# Patient Record
Sex: MALE | Race: WHITE | NOT HISPANIC OR LATINO | URBAN - METROPOLITAN AREA
[De-identification: names, ages, dates, MRNs, and addresses within clinical notes are randomized per-mention and may not be internally consistent; named-entity substitution may affect disease eponyms.]

---

## 2017-06-13 ENCOUNTER — ALLSCRIPTS OFFICE VISIT (OUTPATIENT)
Dept: OTHER | Facility: OTHER | Age: 42
End: 2017-06-13

## 2017-06-19 ENCOUNTER — GENERIC CONVERSION - ENCOUNTER (OUTPATIENT)
Dept: OTHER | Facility: OTHER | Age: 42
End: 2017-06-19

## 2017-07-19 ENCOUNTER — HOSPITAL ENCOUNTER (OUTPATIENT)
Facility: HOSPITAL | Age: 42
Setting detail: OUTPATIENT SURGERY
Discharge: HOME | End: 2017-07-19
Attending: SURGERY | Admitting: SURGERY
Payer: COMMERCIAL

## 2017-07-19 ENCOUNTER — ANESTHESIA EVENT (OUTPATIENT)
Dept: PERIOP | Facility: HOSPITAL | Age: 42
End: 2017-07-19
Payer: COMMERCIAL

## 2017-07-19 ENCOUNTER — GENERIC CONVERSION - ENCOUNTER (OUTPATIENT)
Dept: OTHER | Facility: OTHER | Age: 42
End: 2017-07-19

## 2017-07-19 ENCOUNTER — ANESTHESIA (OUTPATIENT)
Dept: PERIOP | Facility: HOSPITAL | Age: 42
End: 2017-07-19
Payer: COMMERCIAL

## 2017-07-19 VITALS
RESPIRATION RATE: 16 BRPM | TEMPERATURE: 96.8 F | OXYGEN SATURATION: 96 % | WEIGHT: 250 LBS | HEART RATE: 55 BPM | SYSTOLIC BLOOD PRESSURE: 134 MMHG | DIASTOLIC BLOOD PRESSURE: 76 MMHG | BODY MASS INDEX: 31.08 KG/M2 | HEIGHT: 75 IN

## 2017-07-19 DIAGNOSIS — R22.32 LOCALIZED SWELLING, MASS AND LUMP, LEFT UPPER LIMB: ICD-10-CM

## 2017-07-19 DIAGNOSIS — R22.31 LOCALIZED SWELLING, MASS AND LUMP, RIGHT UPPER LIMB: ICD-10-CM

## 2017-07-19 PROCEDURE — 88304 TISSUE EXAM BY PATHOLOGIST: CPT | Performed by: SURGERY

## 2017-07-19 RX ORDER — GLYCOPYRROLATE 0.2 MG/ML
INJECTION INTRAMUSCULAR; INTRAVENOUS AS NEEDED
Status: DISCONTINUED | OUTPATIENT
Start: 2017-07-19 | End: 2017-07-19 | Stop reason: SURG

## 2017-07-19 RX ORDER — FENTANYL CITRATE 50 UG/ML
INJECTION, SOLUTION INTRAMUSCULAR; INTRAVENOUS AS NEEDED
Status: DISCONTINUED | OUTPATIENT
Start: 2017-07-19 | End: 2017-07-19 | Stop reason: SURG

## 2017-07-19 RX ORDER — LIDOCAINE HYDROCHLORIDE 10 MG/ML
INJECTION, SOLUTION INFILTRATION; PERINEURAL AS NEEDED
Status: DISCONTINUED | OUTPATIENT
Start: 2017-07-19 | End: 2017-07-19 | Stop reason: SURG

## 2017-07-19 RX ORDER — MAGNESIUM HYDROXIDE 1200 MG/15ML
LIQUID ORAL
Status: DISCONTINUED | OUTPATIENT
Start: 1840-12-31 | End: 2017-07-19 | Stop reason: HOSPADM

## 2017-07-19 RX ORDER — PROPOFOL 10 MG/ML
INJECTION, EMULSION INTRAVENOUS AS NEEDED
Status: DISCONTINUED | OUTPATIENT
Start: 2017-07-19 | End: 2017-07-19 | Stop reason: SURG

## 2017-07-19 RX ORDER — SODIUM CHLORIDE, SODIUM LACTATE, POTASSIUM CHLORIDE, CALCIUM CHLORIDE 600; 310; 30; 20 MG/100ML; MG/100ML; MG/100ML; MG/100ML
20 INJECTION, SOLUTION INTRAVENOUS CONTINUOUS
Status: DISCONTINUED | OUTPATIENT
Start: 2017-07-19 | End: 2017-07-19 | Stop reason: HOSPADM

## 2017-07-19 RX ORDER — OXYCODONE HYDROCHLORIDE AND ACETAMINOPHEN 5; 325 MG/1; MG/1
1 TABLET ORAL EVERY 4 HOURS PRN
Status: DISCONTINUED | OUTPATIENT
Start: 2017-07-19 | End: 2017-07-19 | Stop reason: HOSPADM

## 2017-07-19 RX ORDER — FENTANYL CITRATE/PF 50 MCG/ML
25 SYRINGE (ML) INJECTION
Status: DISCONTINUED | OUTPATIENT
Start: 2017-07-19 | End: 2017-07-19 | Stop reason: HOSPADM

## 2017-07-19 RX ORDER — SODIUM CHLORIDE, SODIUM LACTATE, POTASSIUM CHLORIDE, CALCIUM CHLORIDE 600; 310; 30; 20 MG/100ML; MG/100ML; MG/100ML; MG/100ML
INJECTION, SOLUTION INTRAVENOUS CONTINUOUS PRN
Status: DISCONTINUED | OUTPATIENT
Start: 2017-07-19 | End: 2017-07-19 | Stop reason: SURG

## 2017-07-19 RX ORDER — BUPIVACAINE HYDROCHLORIDE AND EPINEPHRINE 2.5; 5 MG/ML; UG/ML
INJECTION, SOLUTION EPIDURAL; INFILTRATION; INTRACAUDAL; PERINEURAL AS NEEDED
Status: DISCONTINUED | OUTPATIENT
Start: 2017-07-19 | End: 2017-07-19 | Stop reason: HOSPADM

## 2017-07-19 RX ORDER — PROPOFOL 10 MG/ML
INJECTION, EMULSION INTRAVENOUS CONTINUOUS PRN
Status: DISCONTINUED | OUTPATIENT
Start: 2017-07-19 | End: 2017-07-19 | Stop reason: SURG

## 2017-07-19 RX ORDER — ONDANSETRON 2 MG/ML
INJECTION INTRAMUSCULAR; INTRAVENOUS AS NEEDED
Status: DISCONTINUED | OUTPATIENT
Start: 2017-07-19 | End: 2017-07-19 | Stop reason: SURG

## 2017-07-19 RX ORDER — MAGNESIUM HYDROXIDE 1200 MG/15ML
LIQUID ORAL AS NEEDED
Status: DISCONTINUED | OUTPATIENT
Start: 2017-07-19 | End: 2017-07-19 | Stop reason: HOSPADM

## 2017-07-19 RX ORDER — IBUPROFEN 200 MG
TABLET ORAL EVERY 6 HOURS PRN
COMMUNITY

## 2017-07-19 RX ORDER — MORPHINE SULFATE 2 MG/ML
2 INJECTION, SOLUTION INTRAMUSCULAR; INTRAVENOUS EVERY 4 HOURS PRN
Status: DISCONTINUED | OUTPATIENT
Start: 2017-07-19 | End: 2017-07-19 | Stop reason: HOSPADM

## 2017-07-19 RX ORDER — KETAMINE HYDROCHLORIDE 50 MG/ML
INJECTION, SOLUTION, CONCENTRATE INTRAMUSCULAR; INTRAVENOUS AS NEEDED
Status: DISCONTINUED | OUTPATIENT
Start: 2017-07-19 | End: 2017-07-19 | Stop reason: SURG

## 2017-07-19 RX ORDER — ONDANSETRON 2 MG/ML
4 INJECTION INTRAMUSCULAR; INTRAVENOUS ONCE AS NEEDED
Status: DISCONTINUED | OUTPATIENT
Start: 2017-07-19 | End: 2017-07-19 | Stop reason: HOSPADM

## 2017-07-19 RX ORDER — OXYCODONE HYDROCHLORIDE AND ACETAMINOPHEN 5; 325 MG/1; MG/1
2 TABLET ORAL EVERY 4 HOURS PRN
Status: DISCONTINUED | OUTPATIENT
Start: 2017-07-19 | End: 2017-07-19 | Stop reason: HOSPADM

## 2017-07-19 RX ORDER — MIDAZOLAM HYDROCHLORIDE 1 MG/ML
INJECTION INTRAMUSCULAR; INTRAVENOUS AS NEEDED
Status: DISCONTINUED | OUTPATIENT
Start: 2017-07-19 | End: 2017-07-19 | Stop reason: SURG

## 2017-07-19 RX ADMIN — GLYCOPYRROLATE 0.1 MG: 0.2 INJECTION, SOLUTION INTRAMUSCULAR; INTRAVENOUS at 08:47

## 2017-07-19 RX ADMIN — FENTANYL CITRATE 25 MCG: 50 INJECTION, SOLUTION INTRAMUSCULAR; INTRAVENOUS at 08:50

## 2017-07-19 RX ADMIN — FENTANYL CITRATE 25 MCG: 50 INJECTION, SOLUTION INTRAMUSCULAR; INTRAVENOUS at 09:00

## 2017-07-19 RX ADMIN — KETAMINE HYDROCHLORIDE 10 MG: 50 INJECTION, SOLUTION INTRAMUSCULAR; INTRAVENOUS at 08:55

## 2017-07-19 RX ADMIN — MIDAZOLAM HYDROCHLORIDE 2 MG: 1 INJECTION, SOLUTION INTRAMUSCULAR; INTRAVENOUS at 08:42

## 2017-07-19 RX ADMIN — PROPOFOL 140 MCG/KG/MIN: 10 INJECTION, EMULSION INTRAVENOUS at 08:50

## 2017-07-19 RX ADMIN — CEFAZOLIN SODIUM 2000 MG: 2 SOLUTION INTRAVENOUS at 09:00

## 2017-07-19 RX ADMIN — LIDOCAINE HYDROCHLORIDE 30 MG: 10 INJECTION, SOLUTION INFILTRATION; PERINEURAL at 08:47

## 2017-07-19 RX ADMIN — KETAMINE HYDROCHLORIDE 10 MG: 50 INJECTION, SOLUTION INTRAMUSCULAR; INTRAVENOUS at 09:00

## 2017-07-19 RX ADMIN — ONDANSETRON 4 MG: 2 INJECTION INTRAMUSCULAR; INTRAVENOUS at 09:40

## 2017-07-19 RX ADMIN — KETAMINE HYDROCHLORIDE 10 MG: 50 INJECTION, SOLUTION INTRAMUSCULAR; INTRAVENOUS at 08:50

## 2017-07-19 RX ADMIN — PROPOFOL 50 MG: 10 INJECTION, EMULSION INTRAVENOUS at 08:49

## 2017-07-19 RX ADMIN — SODIUM CHLORIDE, SODIUM LACTATE, POTASSIUM CHLORIDE, AND CALCIUM CHLORIDE: .6; .31; .03; .02 INJECTION, SOLUTION INTRAVENOUS at 08:20

## 2017-12-11 ENCOUNTER — ALLSCRIPTS OFFICE VISIT (OUTPATIENT)
Dept: OTHER | Facility: OTHER | Age: 42
End: 2017-12-11

## 2017-12-11 DIAGNOSIS — R53.83 OTHER FATIGUE: ICD-10-CM

## 2017-12-11 DIAGNOSIS — R03.0 ELEVATED BLOOD PRESSURE READING WITHOUT DIAGNOSIS OF HYPERTENSION: ICD-10-CM

## 2017-12-12 ENCOUNTER — TRANSCRIBE ORDERS (OUTPATIENT)
Dept: ADMINISTRATIVE | Age: 42
End: 2017-12-12

## 2017-12-12 ENCOUNTER — APPOINTMENT (OUTPATIENT)
Dept: LAB | Age: 42
End: 2017-12-12
Payer: COMMERCIAL

## 2017-12-12 DIAGNOSIS — R53.83 OTHER FATIGUE: ICD-10-CM

## 2017-12-12 DIAGNOSIS — R03.0 ELEVATED BLOOD PRESSURE READING WITHOUT DIAGNOSIS OF HYPERTENSION: ICD-10-CM

## 2017-12-12 LAB
ALBUMIN SERPL BCP-MCNC: 4.2 G/DL (ref 3.5–5)
ALP SERPL-CCNC: 69 U/L (ref 46–116)
ALT SERPL W P-5'-P-CCNC: 33 U/L (ref 12–78)
ANION GAP SERPL CALCULATED.3IONS-SCNC: 5 MMOL/L (ref 4–13)
AST SERPL W P-5'-P-CCNC: 12 U/L (ref 5–45)
BASOPHILS # BLD AUTO: 0.01 THOUSANDS/ΜL (ref 0–0.1)
BASOPHILS NFR BLD AUTO: 0 % (ref 0–1)
BILIRUB SERPL-MCNC: 0.75 MG/DL (ref 0.2–1)
BUN SERPL-MCNC: 17 MG/DL (ref 5–25)
CALCIUM SERPL-MCNC: 9.1 MG/DL (ref 8.3–10.1)
CHLORIDE SERPL-SCNC: 102 MMOL/L (ref 100–108)
CO2 SERPL-SCNC: 28 MMOL/L (ref 21–32)
CREAT SERPL-MCNC: 0.98 MG/DL (ref 0.6–1.3)
EOSINOPHIL # BLD AUTO: 0.04 THOUSAND/ΜL (ref 0–0.61)
EOSINOPHIL NFR BLD AUTO: 1 % (ref 0–6)
ERYTHROCYTE [DISTWIDTH] IN BLOOD BY AUTOMATED COUNT: 12.5 % (ref 11.6–15.1)
GFR SERPL CREATININE-BSD FRML MDRD: 95 ML/MIN/1.73SQ M
GLUCOSE P FAST SERPL-MCNC: 122 MG/DL (ref 65–99)
HCT VFR BLD AUTO: 44.3 % (ref 36.5–49.3)
HGB BLD-MCNC: 15 G/DL (ref 12–17)
LYMPHOCYTES # BLD AUTO: 1.18 THOUSANDS/ΜL (ref 0.6–4.47)
LYMPHOCYTES NFR BLD AUTO: 18 % (ref 14–44)
MCH RBC QN AUTO: 28.8 PG (ref 26.8–34.3)
MCHC RBC AUTO-ENTMCNC: 33.9 G/DL (ref 31.4–37.4)
MCV RBC AUTO: 85 FL (ref 82–98)
MONOCYTES # BLD AUTO: 0.52 THOUSAND/ΜL (ref 0.17–1.22)
MONOCYTES NFR BLD AUTO: 8 % (ref 4–12)
NEUTROPHILS # BLD AUTO: 4.93 THOUSANDS/ΜL (ref 1.85–7.62)
NEUTS SEG NFR BLD AUTO: 73 % (ref 43–75)
NRBC BLD AUTO-RTO: 0 /100 WBCS
PLATELET # BLD AUTO: 231 THOUSANDS/UL (ref 149–390)
PMV BLD AUTO: 11.3 FL (ref 8.9–12.7)
POTASSIUM SERPL-SCNC: 4 MMOL/L (ref 3.5–5.3)
PROT SERPL-MCNC: 7.6 G/DL (ref 6.4–8.2)
RBC # BLD AUTO: 5.2 MILLION/UL (ref 3.88–5.62)
SODIUM SERPL-SCNC: 135 MMOL/L (ref 136–145)
T4 FREE SERPL-MCNC: 0.93 NG/DL (ref 0.76–1.46)
TSH SERPL DL<=0.05 MIU/L-ACNC: 1.45 UIU/ML (ref 0.36–3.74)
WBC # BLD AUTO: 6.71 THOUSAND/UL (ref 4.31–10.16)

## 2017-12-12 PROCEDURE — 85025 COMPLETE CBC W/AUTO DIFF WBC: CPT

## 2017-12-12 PROCEDURE — 36415 COLL VENOUS BLD VENIPUNCTURE: CPT

## 2017-12-12 PROCEDURE — 80053 COMPREHEN METABOLIC PANEL: CPT

## 2017-12-12 PROCEDURE — 84443 ASSAY THYROID STIM HORMONE: CPT

## 2017-12-12 PROCEDURE — 84439 ASSAY OF FREE THYROXINE: CPT

## 2017-12-12 NOTE — PROGRESS NOTES
Assessment    1  Alcohol abuse (305 00) (F10 10)   2  Hypertension (401 9) (I10)  1  Alcohol abuse-made several recommendations  He will go for screening labs and is being considered for potential treatment with naltrexone  I also few in benefit from some counseling arrangements will be made for this  Long discussion held today regarding the above  We reviewed that he would again benefit from exercise  Await results of labs 2  Hypertension-aggravated by the above-observing with elimination of alcohol 3  Subcutaneous lesions of the arms-lipoma was-previously been referred to surgery 4  Low back pain-has a history of significant lumbar declot the with prior MRI showing herniated disc at L4-5 with compression  Responded to conservative therapy in the past 5  History of cough-test to have a postinflammatory cough with URTIs  Prior allergy blood work showed mild allergies to dust mites otherwise normal   Chest x-ray benign  He never went for methacholine challenge  6   Evaluation the past for headache-felt to be from trauma  CT scan of the brain benign 7  Family history CAD-1st cousin in his 45s  Prior lipid profile benign-REVIEW NEXT VISIT  SENT FOR SCREENING LABS TO INCLUDE CBC, RANDOM SMA, FREE T4 TSH  AS NOTED POTENTIALLY BEING REFERRED TO PSYCHOLOGY  Appointment over the next few weeks   Plan  Fatigue, unspecified type, Prehypertension    · (1) CBC/PLT/DIFF; Status:Active - Retrospective Authorization; Requested for:[id number];    · (1) COMPREHENSIVE METABOLIC PANEL; Status:Active - Retrospective Authorization; Requestedfor:[id number];    · (1) T4, FREE; Status:Active - Retrospective Authorization; Requested for:[id number];    · (1) TSH; Status:Active - Retrospective Authorization; Requested for:[id number];   Go for lab work  Considering use of naltrexone 50 milligrams daily  Appointment with   History of Present Illness  HPI: patient is seen today as an emergency appointment       This patient called because he is concerned about his alcohol use  He says nightly he is drinking several glasses of wine often vodka in addition  He was stop drinking completely  He says he in go for months without alcohol then returns to it  Strong family history of alcohol abuse with grandparents, uncle and a sibling  Does not do any recreational drugs  He says his mental health is good  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  He denies any systemic symptoms  The last time he had alcohol was about 6 months ago when he went 2 months without it  He says alcohol is very much involved in day-to-day activities of his life he wishes to curtail at  He used exercise have we but has not been going to the gym   exam today he has elevated BP  This is been noted previously of though is very much agitated at the time of his current visit  patient denies any systemic symptoms  Specifically there has been no evidence of fever, night sweats, significant weight loss or significant decrease in appetite     had a long discussion today regarding the above  This was a 30-35 minute visit  We talked about psychotherapy for the above  We talked about potential use of naltrexone      Review of Systems  Complete-Male:  Constitutional: No fever or chills, feels well, no tiredness, no recent weight gain or weight loss  Eyes: No complaints of eye pain, no red eyes, no discharge from eyes, no itchy eyes  ENT: no complaints of earache, no hearing loss, no nosebleeds, no nasal discharge, no sore throat, no hoarseness  Cardiovascular: No complaints of slow heart rate, no fast heart rate, no chest pain, no palpitations, no leg claudication, no lower extremity  Respiratory: No complaints of shortness of breath, no wheezing, no cough, no SOB on exertion, no orthopnea or PND  Gastrointestinal: No complaints of abdominal pain, no constipation, no nausea or vomiting, no diarrhea or bloody stools    Genitourinary: No complaints of dysuria, no incontinence, no hesitancy, no nocturia, no genital lesion, no testicular pain  Musculoskeletal: limb pain, but-- no arthralgias,-- no joint swelling,-- no myalgias,-- no joint stiffness-- and-- no limb swelling  Integumentary: a rash, but-- no itching,-- no dry skin,-- no skin lesions-- and-- no skin wound  Neurological: No compliants of headache, no confusion, no convulsions, no numbness or tingling, no dizziness or fainting, no limb weakness, no difficulty walking  Psychiatric: Is not suicidal, no sleep disturbances, no anxiety or depression, no change in personality, no emotional problems  Endocrine: No complaints of proptosis, no hot flashes, no muscle weakness, no erectile dysfunction, no deepening of the voice, no feelings of weakness  Hematologic/Lymphatic: No complaints of swollen glands, no swollen glands in the neck, does not bleed easily, no easy bruising  Active Problems  1  Encounter for PPD test (V74 1) (Z11 1)   2  Fatigue, unspecified type (780 79) (R53 83)   3  Lipoma (214 9) (D17 9)   4  Lower back pain (724 2) (M54 5)   5  Lumbar radiculopathy (724 4) (M54 16)   6  Need for prophylactic vaccination and inoculation against influenza (V04 81) (Z23)   7  Other muscle spasm (728 85) (M62 838)   8  Prehypertension (796 2) (R03 0)   9  Skin rash (782 1) (R21)    Past Medical History  Active Problems And Past Medical History Reviewed: The active problems and past medical history were reviewed and updated today  Surgical History  Surgical History Reviewed: The surgical history was reviewed and updated today  Family History  Mother    1  No pertinent family history    Social History     · Alcohol abuse (305 00) (F10 10)   · Being A Social Drinker   · Denied: History of Drug Use   · Never A Smoker  Social History Reviewed: The social history was reviewed and updated today  The social history was reviewed and is unchanged  Current Meds   1  Cyclobenzaprine HCl - 10 MG Oral Tablet; Take 1 tablet at bedtime as needed for muscle spasm; Therapy: [id number] to ([facility])  Requested for: [id number]; Last Rx:[id number] Ordered  Medication List Reviewed: The medication list was reviewed and updated today  Allergies  1  No Known Drug Allergies    Vitals  Vital Signs    Recorded: 11Dec2017 08:08PM Recorded: 11Dec2017 05:53PM   Temperature  98 3 F   Heart Rate 68    Respiration 14    Systolic 012    Diastolic 92    Height  5 ft 10 in   Weight  263 lb    BMI Calculated  37 74   BSA Calculated  2 34       Physical Exam   Constitutional  General appearance: No acute distress, well appearing and well nourished  Head and Face  Head and face: Normal    Palpation of the face and sinuses: No sinus tenderness  Eyes  Conjunctiva and lids: No erythema, swelling or discharge  Pupils and irises: Equal, round, reactive to light  Ears, Nose, Mouth, and Throat  External inspection of ears and nose: Normal    Otoscopic examination: Tympanic membranes translucent with normal light reflex  Canals patent without erythema  Hearing: Normal    Nasal mucosa, septum, and turbinates: Normal without edema or erythema  Lips, teeth, and gums: Normal, good dentition  Oropharynx: Normal with no erythema, edema, exudate or lesions  Neck  Neck: Supple, symmetric, trachea midline, no masses  Thyroid: Normal, no thyromegaly  Pulmonary  Respiratory effort: No increased work of breathing or signs of respiratory distress  Percussion of chest: Normal    Palpation of chest: Normal    Auscultation of lungs: Clear to auscultation  Cardiovascular  Palpation of heart: Normal PMI, no thrills  Auscultation of heart: Normal rate and rhythm, normal S1 and S2, no murmurs  Carotid pulses: 2+ bilaterally  Abdominal aorta: Normal    Femoral pulses: 2+ bilaterally  Pedal pulses: 2+ bilaterally     Peripheral vascular exam: Normal    Examination of extremities for edema and/or varicosities: Normal    Chest  Breasts: Normal, no dimpling or skin changes appreciated  Palpation of breasts and axillae: Normal, no masses palpated  Chest: Normal    Abdomen  Abdomen: Non-tender, no masses  Liver and spleen: No hepatomegaly or splenomegaly  Examination for hernias: No hernias appreciated  Anus, perineum, and rectum: Normal sphincter tone, no masses, no prolapse  Genitourinary  Scrotal contents: Normal testes, no masses  Penis: Normal, no lesions  Lymphatic  Palpation of lymph nodes in neck: No lymphadenopathy  Palpation of lymph nodes in axillae: No lymphadenopathy  Palpation of lymph nodes in groin: No lymphadenopathy  Palpation of lymph nodes in other areas: No lymphadenopathy  Musculoskeletal  Gait and station: Normal    Inspection/palpation of digits and nails: Normal without clubbing or cyanosis  Inspection/palpation of joints, bones, and muscles: Normal    Range of motion: Normal    Stability: Normal    Muscle strength/tone: Normal    Skin  Skin and subcutaneous tissue: Normal without rashes or lesions  Palpation of skin and subcutaneous tissue: Normal turgor  Neurologic  Cranial nerves: Cranial nerves 2-12 intact  Reflexes: 2+ and symmetric  Sensation: No sensory loss  Coordination: Normal finger to nose and heel to shin  Psychiatric  Judgment and insight: Normal    Orientation to person, place and time: Normal    Recent and remote memory: Intact  Mood and affect: Normal        Future Appointments    Date/Time Provider Specialty Site   01/08/2018 05:00 PM JITENDRA Walker  Internal Medicine Zaira Green MD   06/18/2018 08:00 AM JITENDRA Walker   Internal Medicine Zaira Green MD       Signatures   Electronically signed by : JITENDRA Fuentes ; Dec 11 2017  8:15PM EST                       (Author)

## 2018-01-14 VITALS — RESPIRATION RATE: 14 BRPM | DIASTOLIC BLOOD PRESSURE: 88 MMHG | SYSTOLIC BLOOD PRESSURE: 130 MMHG | HEART RATE: 68 BPM

## 2018-01-23 VITALS
HEART RATE: 68 BPM | BODY MASS INDEX: 37.65 KG/M2 | WEIGHT: 263 LBS | SYSTOLIC BLOOD PRESSURE: 130 MMHG | TEMPERATURE: 98.3 F | RESPIRATION RATE: 14 BRPM | DIASTOLIC BLOOD PRESSURE: 92 MMHG | HEIGHT: 70 IN

## 2021-03-22 ENCOUNTER — OFFICE VISIT (OUTPATIENT)
Dept: PODIATRY | Facility: CLINIC | Age: 46
End: 2021-03-22
Payer: COMMERCIAL

## 2021-03-22 VITALS
SYSTOLIC BLOOD PRESSURE: 128 MMHG | WEIGHT: 264.8 LBS | HEIGHT: 75 IN | DIASTOLIC BLOOD PRESSURE: 84 MMHG | BODY MASS INDEX: 32.92 KG/M2 | HEART RATE: 63 BPM

## 2021-03-22 DIAGNOSIS — B35.1 ONYCHOMYCOSIS: ICD-10-CM

## 2021-03-22 DIAGNOSIS — M79.675 PAIN IN TOE OF LEFT FOOT: ICD-10-CM

## 2021-03-22 DIAGNOSIS — L60.0 INGROWN TOENAIL: Primary | ICD-10-CM

## 2021-03-22 PROCEDURE — 11750 EXCISION NAIL&NAIL MATRIX: CPT | Performed by: PODIATRIST

## 2021-03-22 PROCEDURE — 99242 OFF/OP CONSLTJ NEW/EST SF 20: CPT | Performed by: PODIATRIST

## 2021-03-22 RX ORDER — LIDOCAINE HYDROCHLORIDE 10 MG/ML
1 INJECTION, SOLUTION EPIDURAL; INFILTRATION; INTRACAUDAL; PERINEURAL ONCE
Status: COMPLETED | OUTPATIENT
Start: 2021-03-22 | End: 2021-03-22

## 2021-03-22 RX ORDER — LIDOCAINE HYDROCHLORIDE AND EPINEPHRINE 10; 10 MG/ML; UG/ML
1 INJECTION, SOLUTION INFILTRATION; PERINEURAL ONCE
Status: COMPLETED | OUTPATIENT
Start: 2021-03-22 | End: 2021-03-22

## 2021-03-22 RX ORDER — ERYTHROMYCIN 5 MG/G
OINTMENT OPHTHALMIC
COMMUNITY
Start: 2021-03-09

## 2021-03-22 RX ADMIN — LIDOCAINE HYDROCHLORIDE AND EPINEPHRINE 1 ML: 10; 10 INJECTION, SOLUTION INFILTRATION; PERINEURAL at 16:02

## 2021-03-22 RX ADMIN — LIDOCAINE HYDROCHLORIDE 1 ML: 10 INJECTION, SOLUTION EPIDURAL; INFILTRATION; INTRACAUDAL; PERINEURAL at 16:02

## 2021-03-22 NOTE — PROGRESS NOTES
Assessment/Plan:      Explained the patient that his right great toenail is mycotic which is a fungal infection of the nail plate  Discussed treatment options  Explained that topical medications are generally not successful  Oral Lamisil is recommended  Patient was referred for a liver function test as a precursor  He will be contacted  Regarding the results an oral Lamisil will be sent through the computer if normal       Explained the patient that he has an ingrown nail along the medial nail border of the left great toe  Discussed treatment options recommending partial matrixectomy  The goal of this procedure is permanent elimination of the offending nail border  Procedure performed as follows: Anesthesia via 3 cc of a 1:1 mixture of 1 percent xylocaine with epinephrine and 1 percent xylocaine plain  A Betadine prep was performed  The medial nail border of the left great toe was avulsed to the eponychium  A partial matrixectomy was performed utilizing phenol in a standard manner  A bacitracin dressing was applied  The patient is to soak in warm water twice a day tomorrow followed by a Neosporin dressing  No problem-specific Assessment & Plan notes found for this encounter  Diagnoses and all orders for this visit:    Ingrown toenail  -     lidocaine (PF) (XYLOCAINE-MPF) 1 % injection 1 mL  -     lidocaine-epinephrine (XYLOCAINE/EPINEPHRINE) 1 %-1:100,000 injection 1 mL    Pain in toe of left foot    Onychomycosis  -     Hepatic function panel; Future    Other orders  -     erythromycin (ILOTYCIN) ophthalmic ointment; ADMINISTER INTO THE LEFT EYE EVERY 6 (SIX) HOURS FOR 10 DAYS  -     fluocinonide (LIDEX) 0 05 % cream; APPLY TO AFFECTED AREA TWICE A DAY FOR 14 DAYS          Subjective:      Patient ID: Chano Patino is a 39 y o  male  HPI       Patient presents with 2 complaints    His chief complaint is a painful ingrown toenail affecting the medial nail border of the left great toe   The nail has been a chronic problem for the patient and has been infected in the past   He states that it was very painful last week and he thinks that it may have been infected but he removed a portion of the toenail himself and had relief  A 2nd concern regards his right great toenail  This toenail is thickened yellow  It is not painful but is cosmetically displeasing  The following portions of the patient's history were reviewed and updated as appropriate: allergies, current medications, past family history, past medical history, past social history, past surgical history and problem list     Review of Systems   Constitutional: Negative  Respiratory: Negative  Cardiovascular: Negative  Gastrointestinal: Negative  Musculoskeletal: Negative  Neurological: Negative  Objective:      /84   Pulse 63   Ht 6' 3" (1 905 m)   Wt 120 kg (264 lb 12 8 oz)   BMI 33 10 kg/m²          Physical Exam  Constitutional:       Appearance: Normal appearance  Cardiovascular:      Pulses: Normal pulses  Musculoskeletal: Normal range of motion  General: No deformity  Skin:     Findings: Erythema present  Comments: Pain with palpation medial nail border left great toenail  There is no drainage or evidence of infection  There is erythema along the medial border  The right great toenail is thickened yellow with subungual debris  Neurological:      General: No focal deficit present  Mental Status: He is oriented to person, place, and time  Nail removal    Date/Time: 3/22/2021 4:33 PM  Performed by: Torsten Sanon DPM  Authorized by: Torsten Sanon DPM     Patient location:  ClinicUniversal Protocol:  Consent: Verbal consent obtained    Risks and benefits: risks, benefits and alternatives were discussed  Consent given by: patient  Patient understanding: patient states understanding of the procedure being performed  Patient identity confirmed: verbally with patient      Location:     Foot:  L big toe  Pre-procedure details:     Skin preparation:  Betadine  Anesthesia (see MAR for exact dosages): Anesthesia method:  Nerve block    Block anesthetic:  Lidocaine 1% WITH epi and lidocaine 1% w/o epi    Block injection procedure:  Anatomic landmarks identified  Nail Removal:     Nail removed:  Partial    Nail side:  Medial    Nail bed sutured: no    Ingrown nail:     Wedge excision of skin: no      Nail matrix removed or ablated:  Partial  Post-procedure details:     Dressing:  4x4 sterile gauze and antibiotic ointment    Patient tolerance of procedure:   Tolerated well, no immediate complications

## 2021-03-22 NOTE — LETTER
March 23, 2021     Eyal Babb MD  [address]  [address]  [address] [place] [address]    Patient: Lawanda Menezes   YOB: 1975   Date of Visit: 3/22/2021       Dear Dr Lisy Malave: Thank you for referring Lawanda Menezes to me for evaluation  Below are my notes for this consultation  If you have questions, please do not hesitate to call me  I look forward to following your patient along with you  Sincerely,        Mckay Heredia DPM        CC: No Recipients  DAVID Bustos [facility]  3/22/2021  4:34 PM  Signed  Assessment/Plan:      Explained the patient that his right great toenail is mycotic which is a fungal infection of the nail plate  Discussed treatment options  Explained that topical medications are generally not successful  Oral Lamisil is recommended  Patient was referred for a liver function test as a precursor  He will be contacted  Regarding the results an oral Lamisil will be sent through the computer if normal       Explained the patient that he has an ingrown nail along the medial nail border of the left great toe  Discussed treatment options recommending partial matrixectomy  The goal of this procedure is permanent elimination of the offending nail border  Procedure performed as follows: Anesthesia via 3 cc of a 1:1 mixture of 1 percent xylocaine with epinephrine and 1 percent xylocaine plain  A Betadine prep was performed  The medial nail border of the left great toe was avulsed to the eponychium  A partial matrixectomy was performed utilizing phenol in a standard manner  A bacitracin dressing was applied  The patient is to soak in warm water twice a day tomorrow followed by a Neosporin dressing  No problem-specific Assessment & Plan notes found for this encounter         Diagnoses and all orders for this visit:    Ingrown toenail  -     lidocaine (PF) (XYLOCAINE-MPF) 1 % injection 1 mL  -     lidocaine-epinephrine (XYLOCAINE/EPINEPHRINE) 1 %-1:100,000 injection 1 mL    Pain in toe of left foot    Onychomycosis  -     Hepatic function panel; Future    Other orders  -     erythromycin (ILOTYCIN) ophthalmic ointment; ADMINISTER INTO THE LEFT EYE EVERY 6 (SIX) HOURS FOR 10 DAYS  -     fluocinonide (LIDEX) 0 05 % cream; APPLY TO AFFECTED AREA TWICE A DAY FOR 14 DAYS          Subjective:      Patient ID: Dc Myers is a 39 y o  male  HPI       Patient presents with 2 complaints  His chief complaint is a painful ingrown toenail affecting the medial nail border of the left great toe  The nail has been a chronic problem for the patient and has been infected in the past   He states that it was very painful last week and he thinks that it may have been infected but he removed a portion of the toenail himself and had relief  A 2nd concern regards his right great toenail  This toenail is thickened yellow  It is not painful but is cosmetically displeasing  The following portions of the patient's history were reviewed and updated as appropriate: allergies, current medications, past family history, past medical history, past social history, past surgical history and problem list     Review of Systems   Constitutional: Negative  Respiratory: Negative  Cardiovascular: Negative  Gastrointestinal: Negative  Musculoskeletal: Negative  Neurological: Negative  Objective:      /84   Pulse 63   Ht 6' 3" (1 905 m)   Wt 120 kg (264 lb 12 8 oz)   BMI 33 10 kg/m²          Physical Exam  Constitutional:       Appearance: Normal appearance  Cardiovascular:      Pulses: Normal pulses  Musculoskeletal: Normal range of motion  General: No deformity  Skin:     Findings: Erythema present  Comments: Pain with palpation medial nail border left great toenail  There is no drainage or evidence of infection  There is erythema along the medial border    The right great toenail is thickened yellow with subungual debris  Neurological:      General: No focal deficit present  Mental Status: He is oriented to person, place, and time  Nail removal    Date/Time: 3/22/2021 4:33 PM  Performed by: Evangelina Strickland DPM  Authorized by: Evangelina Strickland DPM     Patient location:  ClinicUniversal Protocol:  Consent: Verbal consent obtained  Risks and benefits: risks, benefits and alternatives were discussed  Consent given by: patient  Patient understanding: patient states understanding of the procedure being performed  Patient identity confirmed: verbally with patient      Location:     Foot:  L big toe  Pre-procedure details:     Skin preparation:  Betadine  Anesthesia (see MAR for exact dosages): Anesthesia method:  Nerve block    Block anesthetic:  Lidocaine 1% WITH epi and lidocaine 1% w/o epi    Block injection procedure:  Anatomic landmarks identified  Nail Removal:     Nail removed:  Partial    Nail side:  Medial    Nail bed sutured: no    Ingrown nail:     Wedge excision of skin: no      Nail matrix removed or ablated:  Partial  Post-procedure details:     Dressing:  4x4 sterile gauze and antibiotic ointment    Patient tolerance of procedure:   Tolerated well, no immediate complications

## 2021-03-26 ENCOUNTER — TELEPHONE (OUTPATIENT)
Dept: INTERNAL MEDICINE CLINIC | Facility: CLINIC | Age: 46
End: 2021-03-26

## 2021-03-26 NOTE — TELEPHONE ENCOUNTER
PT HAS NOT BEEN SEEN HERE SINCE 2017 AND ACCORDING TO HIS CHART HAS ESTABLISHED CARE  [facility] AND [facility]NAL MEDICINE     CAN DR Roann Phoenix BE REMOVED AS PT'S PCP?

## 2021-03-29 ENCOUNTER — OFFICE VISIT (OUTPATIENT)
Dept: PODIATRY | Facility: CLINIC | Age: 46
End: 2021-03-29

## 2021-03-29 VITALS
DIASTOLIC BLOOD PRESSURE: 80 MMHG | BODY MASS INDEX: 32.83 KG/M2 | SYSTOLIC BLOOD PRESSURE: 128 MMHG | WEIGHT: 264 LBS | HEART RATE: 81 BPM | HEIGHT: 75 IN

## 2021-03-29 DIAGNOSIS — L60.0 INGROWN TOENAIL: Primary | ICD-10-CM

## 2021-03-29 PROCEDURE — 99024 POSTOP FOLLOW-UP VISIT: CPT | Performed by: PODIATRIST

## 2021-03-29 NOTE — PROGRESS NOTES
Patient presents for assessment of partial matrixectomy which was performed last week  Surgical site healing uneventfully with minimal discomfort related  Drainage is present within normal limits for procedure performed  The hallux is markedly erythematous but the paucity of symptoms makes it unlikely that we are dealing with an infection  Patient to contact me should problems arise

## 2021-03-30 NOTE — TELEPHONE ENCOUNTER
03/29/21 9:34 PM     Thank you for your request  Your request has been received, reviewed, and the patient chart updated  The PCP has successfully been removed with a patient attribution note  This message will now be completed      Thank you  Halley Nogueira

## 2021-06-21 ENCOUNTER — APPOINTMENT (OUTPATIENT)
Dept: RADIOLOGY | Facility: CLINIC | Age: 46
End: 2021-06-21
Payer: COMMERCIAL

## 2021-06-21 VITALS
WEIGHT: 250.8 LBS | BODY MASS INDEX: 31.18 KG/M2 | DIASTOLIC BLOOD PRESSURE: 79 MMHG | SYSTOLIC BLOOD PRESSURE: 120 MMHG | HEART RATE: 62 BPM | HEIGHT: 75 IN

## 2021-06-21 DIAGNOSIS — M25.562 LEFT KNEE PAIN, UNSPECIFIED CHRONICITY: ICD-10-CM

## 2021-06-21 DIAGNOSIS — Z01.89 ENCOUNTER FOR LOWER EXTREMITY COMPARISON IMAGING STUDY: ICD-10-CM

## 2021-06-21 DIAGNOSIS — M25.552 PAIN IN LEFT HIP: ICD-10-CM

## 2021-06-21 DIAGNOSIS — M16.0 PRIMARY OSTEOARTHRITIS OF BOTH HIPS: Primary | ICD-10-CM

## 2021-06-21 PROCEDURE — 99204 OFFICE O/P NEW MOD 45 MIN: CPT | Performed by: ORTHOPAEDIC SURGERY

## 2021-06-21 PROCEDURE — 73502 X-RAY EXAM HIP UNI 2-3 VIEWS: CPT

## 2021-06-21 PROCEDURE — 73560 X-RAY EXAM OF KNEE 1 OR 2: CPT

## 2021-06-21 PROCEDURE — 73562 X-RAY EXAM OF KNEE 3: CPT

## 2021-06-21 NOTE — PROGRESS NOTES
Assessment/Plan:  1  Primary osteoarthritis of both hips  Ambulatory referral to Physical Therapy   2  Left knee pain, unspecified chronicity  XR knee 3 vw left non injury    XR hip/pelv 2-3 vws left if performed   3  Pain in left hip  XR hip/pelv 2-3 vws left if performed    Ambulatory referral to Physical Therapy   4  Encounter for lower extremity comparison imaging study  XR knee 1 or 2 vw right     Vishal Caba is a pleasant 70-year-old man who presents to our clinic as a self-referral for chronic left knee pain and stiffness as well as left hip pain  after thorough review of his imaging as well as a comprehensive history and physical examination, I believe Vishal Caba suffering from severe osteoarthritis of his left hip  Of note imaging also indicates severe osteoarthritis with tenderness right hip though many of his symptoms are left-sided  Patient likely has referred left hip pain in his left knee  We discussed various treatment options for his osteoarthritis in his hips, including physical therapy, hip injection, and even total hip replacement  Patient would like to move forward with more conservative options, which we agree with  I do believe that patient will require total hip replacements in the future  We have placed a referral for physical therapy  We will plan to see the patient back in our office in 6 weeks  All of his questions were answered to his satisfaction  Subjective: Evaluation of left knee pain    Patient ID: Doreen Conte is a 55 y o  male  HPI  Vishal Caba is a 55year old man who presents to our clinic for left knee pain and stiffness  Pain has been ongoing for the past 8 months  He notes that he has gained some weight over the past year  He has started working out more frequently over the past few weeks and states it has worsened the pain  He has not tried any medication for the pain  Of note, he also has had some intermittent stiffness and pain in his left groin   He denies any mechanical symptoms in his knee or his hip  Review of Systems   Constitutional: Positive for activity change  Negative for chills, diaphoresis, fatigue and fever  HENT: Negative  Eyes: Negative  Respiratory: Negative for cough, chest tightness, shortness of breath and wheezing  Cardiovascular: Negative for chest pain, palpitations and leg swelling  Gastrointestinal: Negative for abdominal distention, abdominal pain, blood in stool, constipation, diarrhea, nausea and vomiting  Endocrine: Negative  Genitourinary: Negative  Negative for difficulty urinating, dysuria, frequency, hematuria and urgency  Musculoskeletal: Positive for arthralgias  Negative for back pain, joint swelling, neck pain and neck stiffness  See HPI   Skin: Negative for pallor and rash  Neurological: Negative for dizziness, facial asymmetry, weakness, light-headedness, numbness and headaches  Hematological: Negative  Psychiatric/Behavioral: Negative  All other systems reviewed and are negative  History reviewed  No pertinent past medical history  Past Surgical History:   Procedure Laterality Date    MASS EXCISION Bilateral 7/19/2017    Procedure: UPPER EXTREMITY LESION EXCISIONS (RIGHT X2) (LEFT X4); Surgeon: Amanda Rodrigues MD;  Location: [facility];  Service: General    OTHER SURGICAL HISTORY Left     Shoulder sgy        Family History   Problem Relation Age of Onset    No Known Problems Mother        Social History     Occupational History    Not on file   Tobacco Use    Smoking status: Never Smoker    Smokeless tobacco: Never Used   Vaping Use    Vaping Use: Never used   Substance and Sexual Activity    Alcohol use:  Yes     Alcohol/week: 4 0 standard drinks     Types: 4 Glasses of wine per week     Comment: Alcohol abuse    Drug use: No    Sexual activity: Not on file         Current Outpatient Medications:     cyclobenzaprine (FLEXERIL) 10 mg tablet, Take by mouth (Patient not taking: Reported on 6/21/2021), Disp: , Rfl:     erythromycin (ILOTYCIN) ophthalmic ointment, ADMINISTER INTO THE LEFT EYE EVERY 6 (SIX) HOURS FOR 10 DAYS  (Patient not taking: Reported on 6/21/2021), Disp: , Rfl:     fluocinonide (LIDEX) 0 05 % cream, APPLY TO AFFECTED AREA TWICE A DAY FOR 14 DAYS (Patient not taking: Reported on 6/21/2021), Disp: , Rfl:     ibuprofen (MOTRIN) 200 mg tablet, Take by mouth every 6 (six) hours as needed for mild pain (Patient not taking: Reported on 6/21/2021), Disp: , Rfl:     No Known Allergies    Objective:  Vitals:    06/21/21 0842   BP: 120/79   Pulse: 62       Body mass index is 31 35 kg/m²  Left Knee Exam     Tenderness   The patient is experiencing tenderness in the medial joint line  Range of Motion   Extension: 5   Flexion: 110     Tests   Varus: negative Valgus: negative  Drawer:  Anterior - negative     Posterior - negative    Other   Erythema: absent    Comments:  4/5 knee extension      Left Hip Exam     Tenderness   The patient is experiencing no tenderness  Range of Motion   External rotation:  40 abnormal   Internal rotation: 5 abnormal     Muscle Strength   Flexion: 4/5     Tests   MAXINE: negative    Other   Erythema: absent  Scars: absent  Sensation: normal  Pulse: present    Comments:  Joe negative            Physical Exam  Vitals and nursing note reviewed  Constitutional:       General: He is not in acute distress  Appearance: He is obese  He is not ill-appearing or diaphoretic  HENT:      Head: Normocephalic and atraumatic  Eyes:      Extraocular Movements: Extraocular movements intact  Conjunctiva/sclera: Conjunctivae normal    Cardiovascular:      Pulses: Normal pulses  Pulmonary:      Effort: Pulmonary effort is normal  No respiratory distress  Musculoskeletal:      Cervical back: Normal range of motion  Comments: See ortho exam   Skin:     General: Skin is warm and dry        Capillary Refill: Capillary refill takes less than 2 seconds  Neurological:      General: No focal deficit present  Mental Status: He is alert and oriented to person, place, and time  Psychiatric:         Mood and Affect: Mood normal          Behavior: Behavior normal          I have personally reviewed pertinent films in PACS  X-ray bilateral knees on 06/21/2021 showed no acute fracture dislocation, no evidence of degenerative changes, no soft tissue abnormalities, no lytic or blastic lesions  X-ray of bilateral hips shows no acute fracture dislocation, evidence of severe osteoarthritis with severe joint space narrowing, osteophytosis, sclerosis, and femoral head deformity, and no lytic or blastic lesions

## (undated) DEVICE — STRL UNIVERSAL MINOR GENERAL: Brand: CARDINAL HEALTH

## (undated) DEVICE — GLOVE SRG BIOGEL ECLIPSE 7

## (undated) DEVICE — FLEXIBLE ADHESIVE BANDAGE,X-LARGE: Brand: CURITY

## (undated) DEVICE — PLUMEPEN PRO 10FT

## (undated) DEVICE — CHLORAPREP HI-LITE 26ML ORANGE

## (undated) DEVICE — ADHESIVE SKN CLSR HISTOACRYL FLEX 0.5ML LF

## (undated) DEVICE — INTENDED FOR TISSUE SEPARATION, AND OTHER PROCEDURES THAT REQUIRE A SHARP SURGICAL BLADE TO PUNCTURE OR CUT.: Brand: BARD-PARKER SAFETY BLADES SIZE 15, STERILE

## (undated) DEVICE — SUT ETHILON 4-0 PS-2 18 IN 1667G